# Patient Record
Sex: FEMALE | Race: BLACK OR AFRICAN AMERICAN
[De-identification: names, ages, dates, MRNs, and addresses within clinical notes are randomized per-mention and may not be internally consistent; named-entity substitution may affect disease eponyms.]

---

## 2017-02-25 NOTE — RAD
Left great toe, 3 views, 2/25/2017:



History: Great toe pain



No fracture or destructive bony lesion is seen. There is mild degenerative

change at the first MTP joint. The soft tissues are unremarkable.



IMPRESSION:

1. Mild degenerative change.

2. No acute bony abnormality is detected.

## 2017-02-25 NOTE — PHYS DOC
Past Medical History


Past Medical History:  Asthma, High Cholesterol, Hypertension


Past Surgical History:  Tubal ligation, Other


Additional Past Surgical Histo:  MASS REMOVED FROM HER VAGINA; cyst on face


Alcohol Use:  Occasionally


Drug Use:  None





Adult General


Chief Complaint


Chief Complaint:  LOWER EXT PAIN





HPI


HPI


Patient is a 52  year old female who presents with left 2nd toe pain for 2 

months. She denies any known injury. She saw her PCP for the pain previously. 

She was told that she does not have gout and was prescribed a medication that 

she was not able to afford. Her PCP is Dr. Simon.





Review of Systems


Review of Systems


Constitutional: Denies fever or chills. []


Musculoskeletal: Reports left second toe pain.


Integument: Denies rash or skin lesions. []


Neurologic: Denies focal weakness or sensory changes. []





Allergies


Allergies





 Allergies








Coded Allergies Type Severity Reaction Last Updated Verified


 


  No Known Drug Allergies    8/20/14 No











Physical Exam


Physical Exam





Constitutional: Well developed, well nourished, no acute distress, non-toxic 

appearance. []


HENT: Normocephalic, atraumatic, oropharynx moist. []


Eyes: PERRLA, EOMI, conjunctiva normal, no discharge. [] 


Skin: Warm, dry, no rash. There is mild erythema of the plantar surface of the 

distal phalanx of the left second toe with peeling of the skin.


Extremities: Left second toe tenderness particularly over the PIP joint, ROM 

intact, no edema. 2+ DP pulse. Less than 2 second capillary refill in the toes 

distally. Light touch sensation intact in the toes.


Neurologic: Alert and oriented X 3, normal motor function, normal sensory 

function, no focal deficits noted. []


Psychologic: Affect normal, judgement normal, mood normal. []





Current Patient Data


Vital Signs





 Vital Signs








  Date Time  Temp Pulse Resp B/P Pulse Ox O2 Delivery O2 Flow Rate FiO2


 


2/25/17 14:25 98 82 16 147/82 98 Room Air  





 98.0       











EKG


EKG


[]





Radiology/Procedures


Radiology/Procedures


REASON: 2nd toe pain x2 months


PROCEDURE: TOES LEFT





Left great toe, 3 views, 2/25/2017:





History: Great toe pain





No fracture or destructive bony lesion is seen. There is mild degenerative


change at the first MTP joint. The soft tissues are unremarkable.





IMPRESSION:


1. Mild degenerative change.


2. No acute bony abnormality is detected.





Course & Med Decision Making


Course & Med Decision Making


Pertinent Labs and Imaging studies reviewed. (See chart for details)





[]





Dragon Disclaimer


Dragon Disclaimer


This electronic medical record was generated, in whole or in part, using a 

voice recognition dictation system.





Departure


Departure


Impression:  


 Primary Impression:  


 Toe pain, left


Disposition:  01 HOME, SELF-CARE


Condition:  STABLE


Referrals:  


RAJWINDER SIMON MD (PCP)








YADIRA PEÑA DPM


Patient Instructions:  Foot Contusion, Easy-to-Read





Additional Instructions:


Your x-ray does not show any broken bones or dislocation.


Please take the prescribed pain medication as instructed. Do not drive or 

operate heavy machinery while taking pain medication.


Please follow-up with the podiatrist listed below if your toe pain continues.


Return to emergency department if you have any new or concerning symptoms.


Scripts


Cephalexin (Keflex)500 Mg Capsule1 Cap PO BID #14 CAP


   Prov:AMANDA SULLIVAN         2/25/17


Acetaminophen With Codeine (Tylenol With Codeine #3 Tablet)1 Each Tablet1 Tab 

PO PRN Q6HRS PRN PAIN #20 TAB


   Prov:AMANDA SULLIVAN         2/25/17








AMANDA SULLIVAN Feb 25, 2017 15:51

## 2017-04-18 ENCOUNTER — HOSPITAL ENCOUNTER (EMERGENCY)
Dept: HOSPITAL 61 - ER | Age: 52
Discharge: HOME | End: 2017-04-18
Payer: MEDICARE

## 2017-04-18 VITALS — WEIGHT: 112 LBS | BODY MASS INDEX: 16.97 KG/M2 | HEIGHT: 68 IN

## 2017-04-18 VITALS
DIASTOLIC BLOOD PRESSURE: 70 MMHG | SYSTOLIC BLOOD PRESSURE: 115 MMHG | SYSTOLIC BLOOD PRESSURE: 115 MMHG | DIASTOLIC BLOOD PRESSURE: 70 MMHG

## 2017-04-18 DIAGNOSIS — J45.909: ICD-10-CM

## 2017-04-18 DIAGNOSIS — I10: ICD-10-CM

## 2017-04-18 DIAGNOSIS — Z79.82: ICD-10-CM

## 2017-04-18 DIAGNOSIS — R11.2: ICD-10-CM

## 2017-04-18 DIAGNOSIS — Z98.51: ICD-10-CM

## 2017-04-18 DIAGNOSIS — Z87.891: ICD-10-CM

## 2017-04-18 DIAGNOSIS — R07.89: Primary | ICD-10-CM

## 2017-04-18 DIAGNOSIS — K21.9: ICD-10-CM

## 2017-04-18 DIAGNOSIS — E78.00: ICD-10-CM

## 2017-04-18 LAB
ALBUMIN SERPL-MCNC: 3.4 G/DL (ref 3.4–5)
ALP SERPL-CCNC: 79 U/L (ref 46–116)
ALT SERPL-CCNC: 31 U/L (ref 14–59)
ANION GAP SERPL CALC-SCNC: 10 MMOL/L (ref 6–14)
AST SERPL-CCNC: 20 U/L (ref 15–37)
BASOPHILS # BLD AUTO: 0 X10^3/UL (ref 0–0.2)
BASOPHILS NFR BLD: 0 % (ref 0–3)
BILIRUB DIRECT SERPL-MCNC: 0.1 MG/DL (ref 0–0.2)
BILIRUB SERPL-MCNC: 0.5 MG/DL (ref 0.2–1)
BUN SERPL-MCNC: 10 MG/DL (ref 7–20)
CALCIUM SERPL-MCNC: 8.6 MG/DL (ref 8.5–10.1)
CHLORIDE SERPL-SCNC: 99 MMOL/L (ref 98–107)
CO2 SERPL-SCNC: 27 MMOL/L (ref 21–32)
CREAT SERPL-MCNC: 0.8 MG/DL (ref 0.6–1)
EOSINOPHIL NFR BLD: 0 % (ref 0–3)
ERYTHROCYTE [DISTWIDTH] IN BLOOD BY AUTOMATED COUNT: 15.8 % (ref 11.5–14.5)
GFR SERPLBLD BASED ON 1.73 SQ M-ARVRAT: 90.8 ML/MIN
GLUCOSE SERPL-MCNC: 179 MG/DL (ref 70–99)
HCT VFR BLD CALC: 37.3 % (ref 36–47)
HGB BLD-MCNC: 11.8 G/DL (ref 12–15.5)
LYMPHOCYTES # BLD: 0.8 X10^3/UL (ref 1–4.8)
LYMPHOCYTES NFR BLD AUTO: 6 % (ref 24–48)
MCH RBC QN AUTO: 25 PG (ref 25–35)
MCHC RBC AUTO-ENTMCNC: 32 G/DL (ref 31–37)
MCV RBC AUTO: 80 FL (ref 79–100)
MONOCYTES NFR BLD: 6 % (ref 0–9)
NEUTROPHILS NFR BLD AUTO: 87 % (ref 31–73)
PLATELET # BLD AUTO: 243 X10^3/UL (ref 140–400)
PLATELET # BLD EST: ADEQUATE 10*3/UL
POTASSIUM SERPL-SCNC: 4 MMOL/L (ref 3.5–5.1)
PROT SERPL-MCNC: 7.2 G/DL (ref 6.4–8.2)
RBC # BLD AUTO: 4.65 X10^6/UL (ref 3.5–5.4)
SODIUM SERPL-SCNC: 136 MMOL/L (ref 136–145)
WBC # BLD AUTO: 12.6 X10^3/UL (ref 4–11)

## 2017-04-18 PROCEDURE — 36415 COLL VENOUS BLD VENIPUNCTURE: CPT

## 2017-04-18 PROCEDURE — 84484 ASSAY OF TROPONIN QUANT: CPT

## 2017-04-18 PROCEDURE — 83690 ASSAY OF LIPASE: CPT

## 2017-04-18 PROCEDURE — 93005 ELECTROCARDIOGRAM TRACING: CPT

## 2017-04-18 PROCEDURE — 85007 BL SMEAR W/DIFF WBC COUNT: CPT

## 2017-04-18 PROCEDURE — 80076 HEPATIC FUNCTION PANEL: CPT

## 2017-04-18 PROCEDURE — 83880 ASSAY OF NATRIURETIC PEPTIDE: CPT

## 2017-04-18 PROCEDURE — 85027 COMPLETE CBC AUTOMATED: CPT

## 2017-04-18 PROCEDURE — 71010: CPT

## 2017-04-18 PROCEDURE — 80048 BASIC METABOLIC PNL TOTAL CA: CPT

## 2017-04-18 NOTE — EKG
[facility]

               [address] [place] [address]

Test Date:    2017               Test Time:    18:16:19

Pat Name:     JUVENTINO NAVARRO            Department:   

Patient ID:   [id number]           Room:          

Gender:       F                        Technician:   

:          1965               Requested By: SOLE JOHN

Order Number: [id number]            Reading MD:   Erendira Kim

                                 Measurements

Intervals                              Axis          

Rate:         95                       P:            68

WY:           162                      QRS:          67

QRSD:         86                       T:            87

QT:           372                                    

QTc:          471                                    

                           Interpretive Statements

SINUS RHYTHM

QRS(T) CONTOUR ABNORMALITY

CONSIDER ANTEROSEPTAL MYOCARDIAL DAMAGE

T ABNORMALITY IN ANTERIOR LEADS

ABNORMAL ECG





Electronically Signed On 2017 13:37:04 CDT by Erendira Kim

## 2017-04-18 NOTE — PHYS DOC
Past Medical History


Past Medical History:  Asthma, GERD, High Cholesterol, Hypertension


Past Surgical History:  Tubal ligation, Other


Additional Past Surgical Histo:  MASS REMOVED FROM HER VAGINA; cyst on face


Alcohol Use:  Occasionally


Drug Use:  None





Adult General


Chief Complaint


Chief Complaint:  CHEST PAIN





HPI


HPI


52-year-old female with a history of hyperlipidemia currently reporting to be a 

statin hypertension and history of smoking who presents emergency department 

with chest pain this started at 7 AM. She describes as a stabbing sharp pain 

that is nonradiating and not associated with nausea vomiting or diaphoresis. 

The pain is intermittent. She is never seen a cardiologist. She denies 

unilateral leg swelling hemoptysis personal or family history of blood clotting 

disorders. No alleviating or exacerbating factors present.





Review of systems is negative for nausea vomiting abdominal pain fevers chills. 

She denies cough. All other review of systems is negative unless otherwise 

noted in history of present illness.





Review of Systems


Review of Systems


SEE ABOVE.





Current Medications


Current Medications





 Current Medications








 Medications


  (Trade)  Dose


 Ordered  Sig/Rogers  Start Time


 Stop Time Status Last Admin


Dose Admin


 


 Aspirin


  ([facility]


 Aspirin)  324 mg  1X  ONCE  4/18/17 18:15


 4/18/17 18:19 DC 4/18/17 18:40


324 MG











Allergies


Allergies





 Allergies








Coded Allergies Type Severity Reaction Last Updated Verified


 


  No Known Drug Allergies    8/20/14 No











Physical Exam


Physical Exam





Constitutional: Well developed, well nourished, no acute distress, non-toxic 

appearance. 


HENT: Normocephalic, atraumatic, bilateral external ears normal, oropharynx 

moist, no oral exudates, nose normal. 


Eyes: PERRLA, EOMI, conjunctiva normal, no discharge. [] 


Neck: Normal range of motion, no tenderness, supple, no stridor. [] 


Cardiovascular:Heart rate regular rhythm, no murmur 


Lungs & Thorax:  Bilateral breath sounds clear to auscultation []


Abdomen: Bowel sounds normal, soft, no tenderness, no masses, no pulsatile 

masses.  


Skin: Warm, dry, no erythema, no rash. [] 


Back: No tenderness, no CVA tenderness.  


Extremities: No tenderness, no cyanosis, no clubbing, ROM intact, no edema.  


Neurologic: Alert and oriented X 3, normal motor function, normal sensory 

function, no focal deficits noted. []


Psychologic: Affect normal, judgement normal, mood normal. []





Current Patient Data


Vital Signs





 Vital Signs








  Date Time  Temp Pulse Resp B/P Pulse Ox O2 Delivery O2 Flow Rate FiO2


 


4/18/17 18:15  96 16 131/73 94 Room Air  








Lab Values





 Laboratory Tests








Test


  4/18/17


18:37


 


White Blood Count


  12.6x10^3/uL


(4.0-11.0)  H


 


Red Blood Count


  4.65x10^6/uL


(3.50-5.40)


 


Hemoglobin


  11.8g/dL


(12.0-15.5)  L


 


Hematocrit


  37.3%


(36.0-47.0)


 


Mean Corpuscular Volume 80fL ()  


 


Mean Corpuscular Hemoglobin 25pg (25-35)  


 


Mean Corpuscular Hemoglobin


Concent 32g/dL (31-37)


 


 


Red Cell Distribution Width


  15.8%


(11.5-14.5)  H


 


Platelet Count


  243x10^3/uL


(140-400)


 


Neutrophils (%) (Auto) 87% (31-73)  H


 


Lymphocytes (%) (Auto) 6% (24-48)  L


 


Monocytes (%) (Auto) 6% (0-9)  


 


Eosinophils (%) (Auto) 0% (0-3)  


 


Basophils (%) (Auto) 0% (0-3)  


 


Neutrophils # (Auto)


  11.0x10^3uL


(1.8-7.7)  H


 


Lymphocytes # (Auto)


  0.8x10^3/uL


(1.0-4.8)  L


 


Monocytes # (Auto)


  0.7x10^3/uL


(0.0-1.1)


 


Eosinophils # (Auto)


  0.1x10^3/uL


(0.0-0.7)


 


Basophils # (Auto)


  0.0x10^3/uL


(0.0-0.2)


 


Segmented Neutrophils % 86% (35-66)  H


 


Band Neutrophils % 2% (0-9)  


 


Lymphocytes % 8% (24-48)  L


 


Monocytes % 4% (0-10)  


 


Platelet Estimate


  Adequate


(ADEQUATE)


 


Sodium Level


  136mmol/L


(136-145)


 


Potassium Level


  4.0mmol/L


(3.5-5.1)


 


Chloride Level


  99mmol/L


()


 


Carbon Dioxide Level


  27mmol/L


(21-32)


 


Anion Gap 10 (6-14)  


 


Blood Urea Nitrogen


  10mg/dL (7-20)


 


 


Creatinine


  0.8mg/dL


(0.6-1.0)


 


Estimated GFR


(Cockcroft-Gault) 90.8  


 


 


Glucose Level


  179mg/dL


(70-99)  H


 


Calcium Level


  8.6mg/dL


(8.5-10.1)


 


Total Bilirubin


  0.5mg/dL


(0.2-1.0)


 


Direct Bilirubin


  0.1mg/dL


(0.0-0.2)


 


Aspartate Amino Transferase


(AST) 20U/L (15-37)  


 


 


Alanine Aminotransferase (ALT) 31U/L (14-59)  


 


Alkaline Phosphatase


  79U/L ()


 


 


Troponin I Quantitative


  < 0.017ng/mL


(0.000-0.055)


 


NT-Pro-B-Type Natriuretic


Peptide 51pg/mL


(0-124)


 


Total Protein


  7.2g/dL


(6.4-8.2)


 


Albumin


  3.4g/dL


(3.4-5.0)


 


Lipase


  80U/L ()


 





 Laboratory Tests


4/18/17 18:37








 Laboratory Tests


4/18/17 18:37














EKG


EKG


[] EKG shows sinus rhythm with a regular rate. ST segments congruent. Probable 

LVH present. Not consistent with ischemia.





Radiology/Procedures


Radiology/Procedures


[]Chest x-ray reviewed by myself shows no obvious infiltrate or pneumothorax 

present. No  obvious acute cardiopulmonary process present.





Course & Med Decision Making


Course & Med Decision Making


Pertinent Labs and Imaging studies reviewed. (See chart for details)





[] 52-year-old female presenting to the emergency department today with chest 

pain started at 7 AM. Vital signs unremarkable. Pertinent physical exam 

findings show normal physical exam. EKG unremarkable. Chest x-ray unremarkable. 

Troponin negative. Pain and been present for approximately 12 hours. 1 troponin 

sufficient at this time. Patient's pain is much improved from before she came 

after the aspirin. She was subsequent discharged home to follow up with 

cardiology over the next day or 2. Heart score 3.





Dragon Disclaimer


Dragon Disclaimer


This electronic medical record was generated, in whole or in part, using a 

voice recognition dictation system.





Departure


Departure


Impression:  


 Primary Impression:  


 Chest pain


Disposition:  01 HOME, SELF-CARE


Condition:  STABLE


Referrals:  


RAJWINDER SIMON MD (PCP)








DOV GEIGER MD


in 1 to 2 days. heart doctor.


Patient Instructions:  Chest Pain (Nonspecific)





Additional Instructions:


Thank you for allowing us to participate in your care today.





Followup with your primary care physician in 3 days if your symptoms do not 

improve. If you do not have a primary care provider you can ask for a list of 

our primary care providers. Return to the emergency department you have any new 

or concerning findings.





This should be evaluated by the primary care physician and any necessary 

consulting services for continued management within a few days after discharge. 

Return to emergency room if you have any  new or concerning symptoms including 

but not limited to fever, chills, nausea, vomiting, intractable pain, any new 

rashes, chest pain, shortness of air, uncontrolled bleeding, difficulty 

breathing, and/or vision loss.





You may have been prescribed medication that can change in your level of 

thinking and ability to operate machinery. These medications include 

hydrocodone and Ativan. Also, Benadryl has been known to do this as well. Be 

sure to check with your pharmacist and ask if the medications you've prescribed 

can affect your level of consciousness. I recommend not operating heavy 

machinery or driving while on medication such as these.


Scripts


Aspirin 81 Mg Tab.chew1 Tab PO DAILY #14 TAB  Ref 3


   Prov:SOLE JOHN MD         4/18/17





Problem Qualifiers








 Primary Impression:  


 Chest pain


 Chest pain type:  unspecified  Qualified Code:  R07.9 - Chest pain, unspecified





SOLE JOHN MD Apr 18, 2017 20:01

## 2017-04-19 NOTE — RAD
Portable chest, 4/18/2017:



History: Chest pain



Comparison is made to a study from 1/28/2014. The heart size and pulmonary

vascularity are normal. No pulmonary infiltrates are seen. There is no

evidence of pleural fluid.



IMPRESSION: No acute cardiopulmonary abnormality is detected.

## 2017-05-25 ENCOUNTER — HOSPITAL ENCOUNTER (EMERGENCY)
Dept: HOSPITAL 61 - ER | Age: 52
Discharge: HOME | End: 2017-05-25
Payer: MEDICARE

## 2017-05-25 VITALS — WEIGHT: 112 LBS | HEIGHT: 68 IN | BODY MASS INDEX: 16.97 KG/M2

## 2017-05-25 DIAGNOSIS — K21.9: ICD-10-CM

## 2017-05-25 DIAGNOSIS — E78.00: ICD-10-CM

## 2017-05-25 DIAGNOSIS — J45.901: Primary | ICD-10-CM

## 2017-05-25 DIAGNOSIS — I10: ICD-10-CM

## 2017-05-25 PROCEDURE — 71020: CPT

## 2017-05-25 PROCEDURE — 94250: CPT

## 2017-05-25 PROCEDURE — 94640 AIRWAY INHALATION TREATMENT: CPT

## 2017-05-25 PROCEDURE — 99284 EMERGENCY DEPT VISIT MOD MDM: CPT

## 2017-05-25 NOTE — RAD
Indication cough and congestion. Shortness of breath.



PA and lateral views of the chest were obtained. Comparison is made to an

examination 4/18/2017.



There is mild hyperexpansion. The lungs are clear. There is no pleural fluid

or pneumothorax. There has not been a significant change compared to the

previous exam.



IMPRESSION: No acute or focal process. No significant change

## 2017-05-25 NOTE — PHYS DOC
Past Medical History


Past Medical History:  Asthma, GERD, High Cholesterol, Hypertension


Past Surgical History:  Tubal ligation, Other


Additional Past Surgical Histo:  MASS REMOVED FROM HER VAGINA; cyst on face


Alcohol Use:  Occasionally


Drug Use:  None





Adult General


Chief Complaint


Chief Complaint:  COUGH





HPI


HPI





Patient is a 52  year old female presents emergency department stating that she 

has history of asthma. She states that she has ran out of her medications. She'

s had a cough with productive sputum that's been clearing color. Last 8 days. 

She denies any fever, chills or nausea vomiting. Denies any recent use of 

steroids.





Review of Systems


Review of Systems





Constitutional: Denies fever or chills []


Eyes: Denies change in visual acuity, redness, or eye pain []


HENT: Denies nasal congestion or sore throat []


Respiratory:  cough and shortness of breath []


Cardiovascular: No additional information not addressed in HPI []


GI: Denies abdominal pain, nausea, vomiting, bloody stools or diarrhea []


: Denies dysuria or hematuria []


Musculoskeletal: Denies back pain or joint pain []


Integument: Denies rash or skin lesions []


Neurologic: Denies headache, focal weakness or sensory changes []


Endocrine: Denies polyuria or polydipsia []





Current Medications


Current Medications





Current Medications








 Medications


  (Trade)  Dose


 Ordered  Sig/[facility]  Start Time


 Stop Time Status Last Admin


Dose Admin


 


 Albuterol/


 Ipratropium


  (Duoneb)  3 ml  1X  ONCE  17 12:15


 17 12:16 DC 17 12:04


3 ML


 


 Prednisone


  (Prednisone)  40 mg  1X  ONCE  17 12:15


 17 12:16 DC 17 12:18


40 MG











Allergies


Allergies





Allergies








Coded Allergies Type Severity Reaction Last Updated Verified


 


  No Known Drug Allergies    14 No











Physical Exam


Physical Exam





Constitutional: Well developed, well nourished, no acute distress, non-toxic 

appearance. []


HENT: Normocephalic, atraumatic, bilateral external ears normal, oropharynx 

moist, no oral exudates, nose normal. []


Eyes: PERRLA, EOMI, conjunctiva normal, no discharge. [] 


Neck: Normal range of motion, no tenderness, supple, no stridor. [] 


Cardiovascular:Heart rate regular rhythm, no murmur []


Lungs & Thorax:  Bilateral breath sounds with wheezes noted throughout 

bilaterally


Skin: Warm, dry, no erythema, no rash. [] 


Back: No tenderness


Extremities: No tenderness, no cyanosis, no clubbing, ROM intact, no edema. [] 


Neurologic: Alert and oriented X 3, normal motor function, normal sensory 

function, no focal deficits noted. []


Psychologic: Affect normal, judgement normal, mood normal. []





Current Patient Data


Vital Signs





 Vital Signs








  Date Time  Temp Pulse Resp B/P (MAP) Pulse Ox O2 Delivery O2 Flow Rate FiO2


 


17 12:04     100 Room Air  


 


17 11:42 98.4 85 22     





 98.4       











EKG


EKG


[]





Radiology/Procedures


Radiology/Procedures


[facility]


 [address] [place] [address]


 [phone]


 


 IMAGING REPORT





 Signed





PATIENT: JUVENTINO NAVARRO ACCOUNT: [id number] MRN#: [id number]


: 1965 LOCATION: ER AGE: 52


SEX: F EXAM DT: 17 ACCESSION#: [id number]


STATUS: REG ER ORD. PHYSICIAN: LORY MADSEN 


REASON: porductive clear cough x 8 days


PROCEDURE: CHEST PA & LATERAL





Indication cough and congestion. Shortness of breath.





PA and lateral views of the chest were obtained. Comparison is made to an


examination 2017.





There is mild hyperexpansion. The lungs are clear. There is no pleural fluid


or pneumothorax. There has not been a significant change compared to the


previous exam.





IMPRESSION: No acute or focal process. No significant change














DICTATED and SIGNED BY:     MINGO JACKSON MD


DATE:     17 1223





CC: LORY MADSEN; RAJWINDER SIMON MD ~


[]





Course & Med Decision Making


Course & Med Decision Making


Pertinent Labs and Imaging studies reviewed. (See chart for details)


Was provided with a DuoNeb treatment here in the emergency department. Breath 

sounds are clear at this time. Chest x-ray was negative for any pneumonias or 

abnormalities. Patient will be discharged home with a pro-air inhaler. She is 

instructed to contact her primary care physician for a nebulizer machine and 

further medications for her nebulizer treatments at home. Patient will also be 

placed on steroids. Signs and symptoms to return back to emergency department 

has been provided. Patient agrees with discharge instructions treatment 

regimens and follow-up recommendations.


[]





Dragon Disclaimer


Dragon Disclaimer


This electronic medical record was generated, in whole or in part, using a 

voice recognition dictation system.





Departure


Departure


Impression:  


 Primary Impression:  


 Asthma exacerbation


Disposition:   HOME, SELF-CARE


Condition:  STABLE


Referrals:  


RAJWINDER SIMON MD (PCP)


Patient Instructions:  Asthma, Adult, Easy-to-Read





Additional Instructions:  


Activity as tolerated.


Medications as prescribed.


Contact your primary care physician for a nebulizer machine in education.


Return back to emergency prior signs symptoms of become worse.


Follow-up to primary care physician next 3-5 days.


Scripts


Albuterol Sulfate (PROAIR HFA INHALER) 8.5 Gm Hfa.aer.ad


1 PUFF INH PRN Q6HRS Y for SHORTNESS OF BREATH, #1 INHALER 0 Refills


   Prov: LORY MADSEN         17 


Prednisone (PREDNISONE) 20 Mg Tablet


40 MG PO DAILY, #14 TAB


   Prov: LORY MADSEN         17











LORY MADSEN May 25, 2017 11:55

## 2017-10-16 ENCOUNTER — HOSPITAL ENCOUNTER (EMERGENCY)
Dept: HOSPITAL 61 - ER | Age: 52
Discharge: HOME | End: 2017-10-16
Payer: MEDICARE

## 2017-10-16 VITALS
SYSTOLIC BLOOD PRESSURE: 129 MMHG | SYSTOLIC BLOOD PRESSURE: 129 MMHG | DIASTOLIC BLOOD PRESSURE: 78 MMHG | DIASTOLIC BLOOD PRESSURE: 78 MMHG

## 2017-10-16 DIAGNOSIS — E78.00: ICD-10-CM

## 2017-10-16 DIAGNOSIS — K21.9: ICD-10-CM

## 2017-10-16 DIAGNOSIS — I10: ICD-10-CM

## 2017-10-16 DIAGNOSIS — R50.9: ICD-10-CM

## 2017-10-16 DIAGNOSIS — R25.2: Primary | ICD-10-CM

## 2017-10-16 DIAGNOSIS — J45.909: ICD-10-CM

## 2017-10-16 DIAGNOSIS — Z98.51: ICD-10-CM

## 2017-10-16 LAB
ANION GAP SERPL CALC-SCNC: 7 MMOL/L (ref 6–14)
BACTERIA #/AREA URNS HPF: 0 /HPF
BASOPHILS # BLD AUTO: 0 X10^3/UL (ref 0–0.2)
BASOPHILS NFR BLD: 0 % (ref 0–3)
BILIRUB UR QL STRIP: NEGATIVE
BUN SERPL-MCNC: 12 MG/DL (ref 7–20)
CALCIUM SERPL-MCNC: 8.8 MG/DL (ref 8.5–10.1)
CHLORIDE SERPL-SCNC: 103 MMOL/L (ref 98–107)
CK SERPL-CCNC: 68 U/L (ref 26–192)
CO2 SERPL-SCNC: 29 MMOL/L (ref 21–32)
CREAT SERPL-MCNC: 0.7 MG/DL (ref 0.6–1)
EOSINOPHIL NFR BLD: 0 % (ref 0–3)
ERYTHROCYTE [DISTWIDTH] IN BLOOD BY AUTOMATED COUNT: 16.1 % (ref 11.5–14.5)
GFR SERPLBLD BASED ON 1.73 SQ M-ARVRAT: 105.9 ML/MIN
GLUCOSE SERPL-MCNC: 103 MG/DL (ref 70–99)
GLUCOSE UR STRIP-MCNC: NEGATIVE MG/DL
HCT VFR BLD CALC: 38.1 % (ref 36–47)
HGB BLD-MCNC: 12.5 G/DL (ref 12–15.5)
LYMPHOCYTES # BLD: 1.1 X10^3/UL (ref 1–4.8)
LYMPHOCYTES NFR BLD AUTO: 9 % (ref 24–48)
MCH RBC QN AUTO: 25 PG (ref 25–35)
MCHC RBC AUTO-ENTMCNC: 33 G/DL (ref 31–37)
MCV RBC AUTO: 77 FL (ref 79–100)
MONOCYTES NFR BLD: 7 % (ref 0–9)
NEUTROPHILS NFR BLD AUTO: 83 % (ref 31–73)
NITRITE UR QL STRIP: NEGATIVE
OBC FLU: (no result)
PH UR STRIP: 6.5 [PH]
PLATELET # BLD AUTO: 272 X10^3/UL (ref 140–400)
POTASSIUM SERPL-SCNC: 3.9 MMOL/L (ref 3.5–5.1)
PROT UR STRIP-MCNC: NEGATIVE MG/DL
RBC # BLD AUTO: 4.93 X10^6/UL (ref 3.5–5.4)
RBC #/AREA URNS HPF: (no result) /HPF (ref 0–2)
SODIUM SERPL-SCNC: 139 MMOL/L (ref 136–145)
SP GR UR STRIP: 1.01
SQUAMOUS #/AREA URNS LPF: (no result) /LPF
UROBILINOGEN UR-MCNC: 0.2 MG/DL
WBC # BLD AUTO: 11.6 X10^3/UL (ref 4–11)
WBC #/AREA URNS HPF: 0 /HPF (ref 0–4)

## 2017-10-16 PROCEDURE — 80048 BASIC METABOLIC PNL TOTAL CA: CPT

## 2017-10-16 PROCEDURE — 87040 BLOOD CULTURE FOR BACTERIA: CPT

## 2017-10-16 PROCEDURE — 87804 INFLUENZA ASSAY W/OPTIC: CPT

## 2017-10-16 PROCEDURE — 96361 HYDRATE IV INFUSION ADD-ON: CPT

## 2017-10-16 PROCEDURE — 96374 THER/PROPH/DIAG INJ IV PUSH: CPT

## 2017-10-16 PROCEDURE — 85025 COMPLETE CBC W/AUTO DIFF WBC: CPT

## 2017-10-16 PROCEDURE — 83605 ASSAY OF LACTIC ACID: CPT

## 2017-10-16 PROCEDURE — 81001 URINALYSIS AUTO W/SCOPE: CPT

## 2017-10-16 PROCEDURE — 99285 EMERGENCY DEPT VISIT HI MDM: CPT

## 2017-10-16 PROCEDURE — 82550 ASSAY OF CK (CPK): CPT

## 2017-10-16 PROCEDURE — 36415 COLL VENOUS BLD VENIPUNCTURE: CPT

## 2017-10-16 NOTE — PHYS DOC
Past Medical History


Past Medical History:  Asthma, GERD, High Cholesterol, Hypertension


Past Surgical History:  Tubal ligation, Other


Additional Past Surgical Histo:  MASS REMOVED FROM HER VAGINA; cyst on face


Alcohol Use:  Occasionally


Drug Use:  None





Adult General


Chief Complaint


Chief Complaint:  MUSCLE SPASM/CRAMP





HPI


HPI





Patient is a 52  year old F who presents with muscle spasms and cramps. Patient 

states the past couple years she's had chronic muscle spasm and cramps to her 

entire body. Patient states today the spasms and cramps and gotten worse. 

Patient denies any fevers however in triage patient had a 101 temperature. 

Patient denies any chest pain shortness of breath. Patient denies any nausea/

vomiting/diarrhea associated with these. She does not know what brings on the 

muscle cramps or what relieves them. Patient no other complaints.





Review of Systems


Review of Systems


GEN: Denies fevers, chills, sweats


HEENT: Denies blurred vision, sore throat


CV: Denies chest pain


RESP: Denies shortness of air, cough


GI: Denies n/v/d


NEURO: Denies confusion, dizziness


MSK: Muscle cramps





Current Medications


Current Medications





Current Medications








 Medications


  (Trade)  Dose


 Ordered  Sig/Rogers  Start Time


 Stop Time Status Last Admin


Dose Admin


 


 Acetaminophen


  (Tylenol)  1,000 mg  1X  ONCE  10/16/17 16:45


 10/16/17 16:46 DC 10/16/17 17:09


1,000 MG


 


 Diazepam


  (Valium)  5 mg  1X  ONCE  10/16/17 16:30


 10/16/17 16:31 DC 10/16/17 16:29


5 MG


 


 Ibuprofen


  (Motrin)  800 mg  1X  ONCE  10/16/17 16:45


 10/16/17 16:46 DC 10/16/17 17:09


800 MG


 


 Sodium Chloride  1,000 ml @ 


 1,000 mls/hr  1X  ONCE  10/16/17 16:30


 10/16/17 17:29 DC 10/16/17 16:29


1,000 MLS/HR











Allergies


Allergies





Allergies








Coded Allergies Type Severity Reaction Last Updated Verified


 


  No Known Drug Allergies    8/20/14 No











Physical Exam


Physical Exam


GEN.:    No apparent distress.  Alert and oriented.


HEENT:    Head is normocephalic, atraumatic


NECK:    Supple.  


LUNGS:    CTAB.


HEART:    RRR, S1, S2 present.  Peripheral pulses intact


ABDOMEN:    Soft, nontender.  Positive bowel sounds.


EXTREMITIES:    Without any cyanosis.    


NEUROLOGIC:     Normal speech, normal tone


PSYCHIATRIC:    Normal affect, normal mood.


SKIN:   No ulcerations





Current Patient Data


Vital Signs





 Vital Signs








  Date Time  Temp Pulse Resp B/P (MAP) Pulse Ox O2 Delivery O2 Flow Rate FiO2


 


10/16/17 16:30 101.2 81 24 144/78 (100) 99 Room Air  





 101.2       








Lab Values





 Laboratory Tests








Test


  10/16/17


16:36 10/16/17


16:45 10/16/17


18:56


 


Influenza Type A Antigen


  Negative


(NEGATIVE) 


  


 


 


Influenza Type B Antigen


  Negative


(NEGATIVE) 


  


 


 


White Blood Count


  


  11.6 x10^3/uL


(4.0-11.0)  H 


 


 


Red Blood Count


  


  4.93 x10^6/uL


(3.50-5.40) 


 


 


Hemoglobin


  


  12.5 g/dL


(12.0-15.5) 


 


 


Hematocrit


  


  38.1 %


(36.0-47.0) 


 


 


Mean Corpuscular Volume


  


  77 fL ()


L 


 


 


Mean Corpuscular Hemoglobin  25 pg (25-35)   


 


Mean Corpuscular Hemoglobin


Concent 


  33 g/dL


(31-37) 


 


 


Red Cell Distribution Width


  


  16.1 %


(11.5-14.5)  H 


 


 


Platelet Count


  


  272 x10^3/uL


(140-400) 


 


 


Neutrophils (%) (Auto)  83 % (31-73)  H 


 


Lymphocytes (%) (Auto)  9 % (24-48)  L 


 


Monocytes (%) (Auto)  7 % (0-9)   


 


Eosinophils (%) (Auto)  0 % (0-3)   


 


Basophils (%) (Auto)  0 % (0-3)   


 


Neutrophils # (Auto)


  


  9.6 x10^3uL


(1.8-7.7)  H 


 


 


Lymphocytes # (Auto)


  


  1.1 x10^3/uL


(1.0-4.8) 


 


 


Monocytes # (Auto)


  


  0.9 x10^3/uL


(0.0-1.1) 


 


 


Eosinophils # (Auto)


  


  0.0 x10^3/uL


(0.0-0.7) 


 


 


Basophils # (Auto)


  


  0.0 x10^3/uL


(0.0-0.2) 


 


 


Sodium Level


  


  139 mmol/L


(136-145) 


 


 


Potassium Level


  


  3.9 mmol/L


(3.5-5.1) 


 


 


Chloride Level


  


  103 mmol/L


() 


 


 


Carbon Dioxide Level


  


  29 mmol/L


(21-32) 


 


 


Anion Gap  7 (6-14)   


 


Blood Urea Nitrogen


  


  12 mg/dL


(7-20) 


 


 


Creatinine


  


  0.7 mg/dL


(0.6-1.0) 


 


 


Estimated GFR


(Cockcroft-Gault) 


  105.9  


  


 


 


Glucose Level


  


  103 mg/dL


(70-99)  H 


 


 


Lactic Acid Level


  


  0.9 mmol/L


(0.4-2.0) 


 


 


Calcium Level


  


  8.8 mg/dL


(8.5-10.1) 


 


 


Creatine Kinase


  


  68 U/L


() 


 


 


Urine Collection Type   Unknown  


 


Urine Color   Yellow  


 


Urine Clarity   Clear  


 


Urine pH   6.5  


 


Urine Specific Gravity   1.010  


 


Urine Protein


  


  


  Negative mg/dL


(NEG-TRACE)


 


Urine Glucose (UA)


  


  


  Negative mg/dL


(NEG)


 


Urine Ketones (Stick)


  


  


  Negative mg/dL


(NEG)


 


Urine Blood


  


  


  Negative (NEG)


 


 


Urine Nitrite


  


  


  Negative (NEG)


 


 


Urine Bilirubin


  


  


  Negative (NEG)


 


 


Urine Urobilinogen Dipstick


  


  


  0.2 mg/dL (0.2


mg/dL)


 


Urine Leukocyte Esterase


  


  


  Negative (NEG)


 


 


Urine RBC


  


  


  Occ /HPF (0-2)


 


 


Urine WBC   0 /HPF (0-4)  


 


Urine Squamous Epithelial


Cells 


  


  Few /LPF  


 


 


Urine Bacteria


  


  


  0 /HPF (0-FEW)


 





 Laboratory Tests


10/16/17 16:45








 Laboratory Tests


10/16/17 16:45














EKG


EKG


[]





Radiology/Procedures


Radiology/Procedures


[]





Course & Med Decision Making


Course & Med Decision Making


Pertinent Labs and Imaging studies reviewed. (See chart for details)





ED course:


Patient was seen and examined emergency room CBC, CMP, UA, U Peter a, blood 

cultures were ordered


1930: On reevaluation patient states she feels much better and is ready to go 

home. Recommended over-the-counter treatment for her fevers. Patient was ready 

go home.





MDM:


After reviewing the chart, CC/HPI/PMH, physical exam, [lab results], I do not 

believe the patient has a severe bacterial infection warranting further workup 

and/or admission at this time. Patient is showing no signs of sepsis despite 

having a fever in the emergency room. I believe the patient's muscle cramps 

secondary to right years from a fever. On reexamination the patient is 

asymptomatic and wanted to go home. Patient stable for discharge. Additional 

verbal discharge instructions were provided to the patient and that if symptoms 

get worse or any new symptoms arise that are worrisome to the patient she is to 

return to the emergency room immediately


 


[]





Dragon Disclaimer


Dragon Disclaimer


This electronic medical record was generated, in whole or in part, using a 

voice recognition dictation system.





Departure


Departure


Impression:  


 Primary Impression:  


 Muscle cramps


 Additional Impression:  


 Fever


Disposition:  01 HOME, SELF-CARE


Condition:  IMPROVED


Referrals:  


RAJWINDER SIMON MD (PCP)


Patient Instructions:  Muscle Cramps





Additional Instructions:  


Please follow up with your family physician in the next one to 2 days and 

return if symptoms increase





Problem Qualifiers











DARRYL HORNE DO Oct 16, 2017 17:15

## 2020-11-30 ENCOUNTER — HOSPITAL ENCOUNTER (OUTPATIENT)
Dept: HOSPITAL 61 - CT | Age: 55
End: 2020-11-30
Attending: INTERNAL MEDICINE
Payer: MEDICARE

## 2020-11-30 DIAGNOSIS — R91.8: Primary | ICD-10-CM

## 2020-11-30 DIAGNOSIS — J43.9: ICD-10-CM

## 2020-11-30 DIAGNOSIS — Z87.891: ICD-10-CM

## 2020-11-30 PROCEDURE — 71250 CT THORAX DX C-: CPT

## 2020-11-30 NOTE — RAD
EXAM: Chest CT without intravenous contrast.

 

HISTORY: Pulmonary nodule. Cigarette smoking.

 

TECHNIQUE: Computed tomographic images of the chest were obtained without 

contrast. Multiplanar reformatting was performed.

 

*One or more of the following individualized dose reduction techniques 

were utilized for this examination:  

1. Automated exposure control.  

2. Adjustment of the mA and/or kV according to patient size.  

3. Use of iterative reconstruction technique.

 

COMPARISON: None.

 

FINDINGS: The heart is normal in size. There is a small amount of anterior

pericardial fluid or pericardial thickening. There is calcified 

atherosclerotic plaque involving the aorta and coronary arteries. No 

pathologically enlarged lymph node is seen.

 

There is moderate pulmonary emphysema. There is biapical pleural proximal 

scarring and subpleural bleb formation. There is no pneumothorax or 

pleural effusion. There is a nodule with slight spiculated margins and 

foci of internal lucency within the right middle lobe measuring 9 mm in 

maximum dimension. There is a 6 mm nodule within the posterior right lower

lobe. There is a 6 mm nodule within the superior segment of the left lower

lobe. There is an angulated opacity adjacent to pleural parenchymal 

scarring within the left lung apex measuring 9 mm, likely due to scarring.

There is a cluster of tiny nodules measuring up to 3 mm within the lateral

right upper lobe, also likely due to scarring. There is a 3 mm 

pleural-based nodule within the anterior left upper lobe. There is a 4 mm 

nodule within the right middle lobe. There is a 7 mm groundglass nodule 

within the anterior right lung base. There is a 2 mm groundglass nodule 

within the right lung apex. There are 4 mm and 5 mm groundglass nodules 

within the anterior left lower lobe.

 

No hepatic lesion is seen on the field-of-view. There is a 1.8 cm simple 

cyst within the lateral left kidney. Follow-up is not routinely 

recommended for simple renal cysts. There is no suspicious osseous lesion.

 

IMPRESSION: 

1. Multiple bilateral pulmonary nodules, largest which measures 9 mm 

within the right middle lobe and contains stimulated margins and areas of 

internal lucency. This is within near limits for characterization with a 

PET/CT. In the absence of prior studies to confirm stability, a PET/CT or 

3 month CT follow-up is indicated. The additional nodules are 

indeterminate and described in detail above.

2. Pulmonary emphysema with biapical predominant pleural parenchymal 

scarring.

 

Electronically signed by: Chey Brock MD (11/30/2020 12:50 PM) 

[facility]